# Patient Record
Sex: MALE | Race: ASIAN | NOT HISPANIC OR LATINO | Employment: FULL TIME | URBAN - METROPOLITAN AREA
[De-identification: names, ages, dates, MRNs, and addresses within clinical notes are randomized per-mention and may not be internally consistent; named-entity substitution may affect disease eponyms.]

---

## 2021-08-05 ENCOUNTER — HOSPITAL ENCOUNTER (EMERGENCY)
Facility: CLINIC | Age: 36
Discharge: HOME | End: 2021-08-05
Attending: EMERGENCY MEDICINE | Admitting: EMERGENCY MEDICINE
Payer: COMMERCIAL

## 2021-08-05 VITALS
TEMPERATURE: 98.2 F | OXYGEN SATURATION: 99 % | RESPIRATION RATE: 21 BRPM | SYSTOLIC BLOOD PRESSURE: 138 MMHG | DIASTOLIC BLOOD PRESSURE: 88 MMHG | HEART RATE: 91 BPM | WEIGHT: 155 LBS

## 2021-08-05 DIAGNOSIS — T63.441A ANAPHYLACTIC REACTION TO BEE STING, ACCIDENTAL OR UNINTENTIONAL, INITIAL ENCOUNTER: ICD-10-CM

## 2021-08-05 DIAGNOSIS — T78.2XXA ANAPHYLACTIC REACTION TO BEE STING, ACCIDENTAL OR UNINTENTIONAL, INITIAL ENCOUNTER: ICD-10-CM

## 2021-08-05 PROCEDURE — 96374 THER/PROPH/DIAG INJ IV PUSH: CPT

## 2021-08-05 PROCEDURE — 250N000009 HC RX 250: Performed by: EMERGENCY MEDICINE

## 2021-08-05 PROCEDURE — 96375 TX/PRO/DX INJ NEW DRUG ADDON: CPT

## 2021-08-05 PROCEDURE — 250N000009 HC RX 250

## 2021-08-05 PROCEDURE — 96372 THER/PROPH/DIAG INJ SC/IM: CPT | Performed by: EMERGENCY MEDICINE

## 2021-08-05 PROCEDURE — 250N000011 HC RX IP 250 OP 636: Performed by: EMERGENCY MEDICINE

## 2021-08-05 PROCEDURE — 99284 EMERGENCY DEPT VISIT MOD MDM: CPT | Mod: 25

## 2021-08-05 RX ORDER — LIDOCAINE 40 MG/G
CREAM TOPICAL
Status: COMPLETED
Start: 2021-08-05 | End: 2021-08-05

## 2021-08-05 RX ORDER — DIPHENHYDRAMINE HYDROCHLORIDE 50 MG/ML
50 INJECTION INTRAMUSCULAR; INTRAVENOUS ONCE
Status: COMPLETED | OUTPATIENT
Start: 2021-08-05 | End: 2021-08-05

## 2021-08-05 RX ORDER — FAMOTIDINE 20 MG/1
20 TABLET, FILM COATED ORAL 2 TIMES DAILY
Qty: 10 TABLET | Refills: 0 | Status: SHIPPED | OUTPATIENT
Start: 2021-08-05 | End: 2021-08-10

## 2021-08-05 RX ORDER — EPINEPHRINE 1 MG/ML
0.3 INJECTION, SOLUTION, CONCENTRATE INTRAVENOUS ONCE
Status: COMPLETED | OUTPATIENT
Start: 2021-08-05 | End: 2021-08-05

## 2021-08-05 RX ORDER — PREDNISONE 20 MG/1
40 TABLET ORAL DAILY
Qty: 6 TABLET | Refills: 0 | Status: SHIPPED | OUTPATIENT
Start: 2021-08-05 | End: 2021-08-08

## 2021-08-05 RX ORDER — DIPHENHYDRAMINE HYDROCHLORIDE 50 MG/ML
25 INJECTION INTRAMUSCULAR; INTRAVENOUS ONCE
Status: DISCONTINUED | OUTPATIENT
Start: 2021-08-05 | End: 2021-08-05

## 2021-08-05 RX ORDER — EPINEPHRINE 0.3 MG/.3ML
0.3 INJECTION SUBCUTANEOUS
Qty: 2 EACH | Refills: 1 | Status: SHIPPED | OUTPATIENT
Start: 2021-08-05

## 2021-08-05 RX ORDER — DIPHENHYDRAMINE HCL 25 MG
25 TABLET ORAL EVERY 6 HOURS PRN
Qty: 30 TABLET | Refills: 0 | Status: SHIPPED | OUTPATIENT
Start: 2021-08-05

## 2021-08-05 RX ORDER — METHYLPREDNISOLONE SODIUM SUCCINATE 125 MG/2ML
125 INJECTION, POWDER, LYOPHILIZED, FOR SOLUTION INTRAMUSCULAR; INTRAVENOUS ONCE
Status: COMPLETED | OUTPATIENT
Start: 2021-08-05 | End: 2021-08-05

## 2021-08-05 RX ADMIN — FAMOTIDINE 20 MG: 10 INJECTION, SOLUTION INTRAVENOUS at 20:32

## 2021-08-05 RX ADMIN — DIPHENHYDRAMINE HYDROCHLORIDE 50 MG: 50 INJECTION, SOLUTION INTRAMUSCULAR; INTRAVENOUS at 20:32

## 2021-08-05 RX ADMIN — METHYLPREDNISOLONE SODIUM SUCCINATE 125 MG: 125 INJECTION, POWDER, FOR SOLUTION INTRAMUSCULAR; INTRAVENOUS at 20:31

## 2021-08-05 RX ADMIN — LIDOCAINE: 40 CREAM TOPICAL at 21:19

## 2021-08-05 RX ADMIN — EPINEPHRINE 0.3 MG: 1 INJECTION INTRAMUSCULAR; INTRAVENOUS; SUBCUTANEOUS at 20:25

## 2021-08-06 ASSESSMENT — ENCOUNTER SYMPTOMS
FEVER: 0
VOMITING: 0
DIARRHEA: 0
COUGH: 0

## 2021-08-06 NOTE — ED PROVIDER NOTES
History     Chief Complaint:  Allergic Reaction       HPI     Trisha Keys is a 36 year old male who presents with allergic reaction.  Patient reports 30 minutes prior to arrival he was stung by a bee on his right lower leg.  Shortly afterwards he developed diffuse urticaria with pruritus and a sense that his throat was closing.  There is no associated shortness of breath, vomiting or chest pain.  He used an old EpiPen that was greater than 5 years old without any response.  He had not taken any other medications prior to arrival.  He has no other complaints or concerns.    Allergies:  NKDA    Medications:    None    Past Medical History:    None    Family History:    Denies significant family history    Social History:      PCP: No Ref-Primary, Physician     Review of Systems   Constitutional: Negative for fever.   Respiratory: Negative for cough.    Cardiovascular: Negative for chest pain.   Gastrointestinal: Negative for diarrhea and vomiting.   Skin: Positive for rash.   All other systems reviewed and are negative.        Physical Exam   Patient Vitals for the past 24 hrs:   BP Temp Temp src Pulse Resp SpO2 Weight   08/05/21 2245 138/88 -- -- 91 21 99 % --   08/05/21 2230 123/77 -- -- 81 26 100 % --   08/05/21 2215 125/67 -- -- 79 22 98 % --   08/05/21 2200 119/78 -- -- 88 19 99 % --   08/05/21 2145 116/78 -- -- 86 21 100 % --   08/05/21 2130 120/58 -- -- 82 19 99 % --   08/05/21 2115 119/77 -- -- 85 24 98 % --   08/05/21 2100 123/76 -- -- 84 21 99 % --   08/05/21 2045 (!) 134/90 -- -- 86 21 100 % --   08/05/21 2030 (!) 138/96 98.2  F (36.8  C) Temporal 96 21 -- --   08/05/21 2019 126/65 -- -- 120 18 97 % --   08/05/21 2018 -- -- -- -- -- -- 70.3 kg (155 lb)        Physical Exam    HEENT:    Oropharynx is moist, without lesions or trismus.     No posterior pharyngeal edema.     No pooling of secretions.  Eyes:    Conjunctiva normal  Neck:    Supple, no meningismus.     CV:     Regular rate and rhythm.       No murmurs, rubs or gallops.       No  lower extremity edema.  PULM:    Clear to auscultation bilateral.       No respiratory distress.      Good air exchange.     No wheezing or stridor.  ABD:    Soft, non-tender, non-distended.      No rebound, guarding or rigidity.  MSK:     No gross deformity to all four extremities.   LYMPH:   No cervical lymphadenopathy.  NEURO:   Alert, good muscular tone, no atrophy.   Skin:    Warm, dry and intact.      Diffuse urticarial rash to the chest, abdomen, face and proximal extremities  Psych:    Mood is good and affect is appropriate.    Emergency Department Course       Interventions:    Medications   famotidine (PEPCID) injection 20 mg (20 mg Intravenous Given 21)   EPINEPHrine PF (ADRENALIN) injection 0.3 mg (0.3 mg Intramuscular Given 21)   methylPREDNISolone sodium succinate (solu-MEDROL) injection 125 mg (125 mg Intravenous Given 21)   diphenhydrAMINE (BENADRYL) injection 50 mg (50 mg Intravenous Given 21)   lidocaine (LMX4) 4 % cream (  Given 21)        Emergency Department Course:  Past medical records, nursing notes, and vitals reviewed.  I performed an exam of the patient and obtained history, as documented above.      2230: I rechecked the patient.  Patient has a faint residual urticarial rash to the trunk but no additional locations.  Patient has complete resolution of his throat closing sensation.  He feels nearly back to baseline.  He is comfortable with discharge home and understands that he is going to fill his EpiPen prescription before leaving the hospital.      Patient was discharged home.    Impression & Plan      Medical Decision Makin-year-old male presented to the ED with anaphylaxis after a bee sting.  He had pronounced urticaria with a sense that his throat was closing but otherwise appeared well.  He was hemodynamically stable throughout ED course.  With IM epinephrine, antihistamines and  steroids, he has had near complete resolution of all symptoms with exception of a faint urticaria to the trunk alone.  Patient safe for discharge home after 2 hour observation period with steroids, antihistamines and EpiPen as needed.  Patient to return to ED for any worsening symptoms.    Diagnosis:    ICD-10-CM    1. Anaphylactic reaction to bee sting, accidental or unintentional, initial encounter  T63.441A         Discharge Medications:  Discharge Medication List as of 8/5/2021 10:35 PM      START taking these medications    Details   diphenhydrAMINE (BENADRYL) 25 MG tablet Take 1 tablet (25 mg) by mouth every 6 hours as needed for itching or allergies, Disp-30 tablet, R-0, Local Print      EPINEPHrine (ANY BX GENERIC EQUIV) 0.3 MG/0.3ML injection 2-pack Inject 0.3 mLs (0.3 mg) into the muscle once as needed for anaphylaxis, Disp-2 each, R-1, Local Print      famotidine (PEPCID) 20 MG tablet Take 1 tablet (20 mg) by mouth 2 times daily for 5 days, Disp-10 tablet, R-0, Local Print      predniSONE (DELTASONE) 20 MG tablet Take 2 tablets (40 mg) by mouth daily for 3 days, Disp-6 tablet, R-0, Local Print              8/6/2021   Glen Pantoja MD, MD  08/06/21 0105

## 2021-11-21 ENCOUNTER — ANCILLARY PROCEDURE (OUTPATIENT)
Dept: GENERAL RADIOLOGY | Facility: CLINIC | Age: 36
End: 2021-11-21
Attending: FAMILY MEDICINE
Payer: COMMERCIAL

## 2021-11-21 ENCOUNTER — OFFICE VISIT (OUTPATIENT)
Dept: URGENT CARE | Facility: URGENT CARE | Age: 36
End: 2021-11-21
Payer: COMMERCIAL

## 2021-11-21 VITALS
HEART RATE: 66 BPM | TEMPERATURE: 98.7 F | SYSTOLIC BLOOD PRESSURE: 128 MMHG | DIASTOLIC BLOOD PRESSURE: 76 MMHG | WEIGHT: 155 LBS | OXYGEN SATURATION: 98 %

## 2021-11-21 DIAGNOSIS — R05.3 PERSISTENT COUGH FOR 3 WEEKS OR LONGER: ICD-10-CM

## 2021-11-21 DIAGNOSIS — R53.83 OTHER FATIGUE: ICD-10-CM

## 2021-11-21 DIAGNOSIS — R05.9 COUGH IN ADULT: Primary | ICD-10-CM

## 2021-11-21 PROCEDURE — 87798 DETECT AGENT NOS DNA AMP: CPT | Performed by: FAMILY MEDICINE

## 2021-11-21 PROCEDURE — 99203 OFFICE O/P NEW LOW 30 MIN: CPT | Performed by: FAMILY MEDICINE

## 2021-11-21 PROCEDURE — 71046 X-RAY EXAM CHEST 2 VIEWS: CPT | Performed by: RADIOLOGY

## 2021-11-21 RX ORDER — AZITHROMYCIN 250 MG/1
TABLET, FILM COATED ORAL
Qty: 6 TABLET | Refills: 0 | Status: SHIPPED | OUTPATIENT
Start: 2021-11-21 | End: 2021-11-26

## 2021-11-21 RX ORDER — CODEINE PHOSPHATE AND GUAIFENESIN 10; 100 MG/5ML; MG/5ML
1-2 SOLUTION ORAL EVERY 6 HOURS PRN
Qty: 118 ML | Refills: 0 | Status: SHIPPED | OUTPATIENT
Start: 2021-11-21

## 2021-11-21 NOTE — PROGRESS NOTES
Chief Complaint   Patient presents with     Sick     lingering cough x 1 month- MAY be bronchitis,  fatigue from the cough -- taking Dayquil and Nyquil - MAY want xray     Initial differential diagnosis included but was not restricted to   Differential Diagnosis:  URI Adult/Peds: whooping cough , Asthma, Asthma exacerbation, Bronchitis-viral, Pneumonia, Sinusitis, Viral syndrome and Viral upper respiratory illness  Medical Decision Making:    ASSESMENT AND PLAN     Trisha was seen today for sick.    Diagnoses and all orders for this visit:    Cough in adult  -     XR Chest 2 Views    Other fatigue    Persistent cough for 3 weeks or longer  -     B. pertussis/parapertussis PCR-NP  -     azithromycin (ZITHROMAX) 250 MG tablet; Take 2 tablets (500 mg) by mouth daily for 1 day, THEN 1 tablet (250 mg) daily for 4 days.  -     guaiFENesin-codeine (ROBITUSSIN AC) 100-10 MG/5ML solution; Take 5-10 mLs by mouth every 6 hours as needed for cough    Discussed if symptoms get better over the next 2 weeks should follow-up. Discussed even if the result is positive for whooping cough the azithromycin should help with the symptoms   . Patient has cough symptoms going on for 3 weeks or longer would consider checking for whooping cough will treat with azithromycin for possible cough.  X-ray did show bronchial thickening which is possibly due to bronchitis.  Tylenol, Fluids, Rest and OTC cough suppressant/expectorant  Routine discharge counseling was given to the patient and the patient understands that worsening, changing or persistent symptoms should prompt an immediate call or follow up with their primary physician or the emergency department. The importance of appropriate follow up was also discussed with the patient.     I have reviewed the nursing notes.  Review of the result(s) of each unique test -   X-Ray was done, my findings are: Bronchial thickening noted no pneumonia noted.    More than 30 mins Time  spent on the date of  the encounter doing chart review, review of test results, interpretation of tests, patient visit and documentation   see orders in Epic  Pt verbalized and agreed with the plan and is aware of the worsening symptoms for which would need to follow up .  Pt was stable during time of discharge from the clinic     SUBJECTIVE     Trisha Keys is a 36 year old male presenting with a chief complaint of    Chief Complaint   Patient presents with     Sick     lingering cough x 1 month- MAY be bronchitis,  fatigue from the cough -- taking Dayquil and Nyquil - MAY want xray           Trisha Keys is a 36 year old male presenting with a chief complaint of cough - non-productive. He is an established patient of [facility].  Onset of symptoms was 1 month(s) ago.  Course of illness is worsening.    Severity moderate  Current and Associated symptoms: chest congestion  Treatment measures tried include OTC Cough med.  Predisposing factors include recent illness .    History reviewed. No pertinent past medical history.  Current Outpatient Medications   Medication Sig Dispense Refill     azithromycin (ZITHROMAX) 250 MG tablet Take 2 tablets (500 mg) by mouth daily for 1 day, THEN 1 tablet (250 mg) daily for 4 days. 6 tablet 0     guaiFENesin-codeine (ROBITUSSIN AC) 100-10 MG/5ML solution Take 5-10 mLs by mouth every 6 hours as needed for cough 118 mL 0     diphenhydrAMINE (BENADRYL) 25 MG tablet Take 1 tablet (25 mg) by mouth every 6 hours as needed for itching or allergies (Patient not taking: Reported on 11/21/2021) 30 tablet 0     EPINEPHrine (ANY BX GENERIC EQUIV) 0.3 MG/0.3ML injection 2-pack Inject 0.3 mLs (0.3 mg) into the muscle once as needed for anaphylaxis (Patient not taking: Reported on 11/21/2021) 2 each 1     Social History     Tobacco Use     Smoking status: Never Smoker     Smokeless tobacco: Never Used   Substance Use Topics     Alcohol use: Yes     History reviewed. No pertinent family history.      ROS:    10 point ROS of  systems including Constitutional, Eyes Cardiovascular, Gastroenterology, Genitourinary, Integumentary, Muscularskeletal, Psychiatric ,neurological were all negative except for pertinent positives noted in my HPI         OBJECTIVE:    /76 (BP Location: Right arm, Patient Position: Chair, Cuff Size: Adult Regular)   Pulse 66   Temp 98.7  F (37.1  C) (Oral)   Wt 70.3 kg (155 lb)   SpO2 98%   GENERAL APPEARANCE: healthy, alert and no distress  EYES: EOMI,  PERRL, conjunctiva clear  HENT: ear canals and TM's normal.  Nose and mouth without ulcers, erythema or lesions  NECK: supple, nontender, no lymphadenopathy  RESP: lungs clear to auscultation - no rales, rhonchi or wheezes  CV: regular rates and rhythm, normal S1 S2, no murmur noted  PSYCH: mentation appears normal  Physical Exam      (Note was completed, in part, with [employer] voice-recognition software. Documentation reviewed, but some grammatical, spelling, and word errors may remain.)  Maria Dolores Jasso MD on 11/21/2021 at 4:32 PM

## 2021-11-21 NOTE — LETTER
November 29, 2021      Trisha Keys  [id number] [facility] [id number]        Dear ,    We are writing to inform you of your test results. pertussis test (whooping cough test) is negative      Resulted Orders   B. pertussis/parapertussis PCR-NP   Result Value Ref Range    Bordetella pertussis DNA Not Detected Not Detected      Comment:      A negative result does not rule out the presence of PCR inhibitors or Bordetella pertussis DNA in concentrations below the limit of detection of the assay.    Bordetella parapertussis DNA Not Detected Not Detected      Comment:      A negative result does not rule out the presence of PCR inhibitors or Bordetella parapertussis DNA in concentrations below the limit of detection for the assay.    Narrative    The [employer] Molecular Simplexa (TM) Bordetella Direct assay is a FDA-approved, real-time PCR test for the qualitative detection and differentiation of Bordetella pertussis and Bordetella parapertussis in nasopharyngeal swabs. Testing is performed by the [facility]. This test is used for clinical purposes. It should not be regarded as investigational or for research. This laboratory is certified under the Clinical Laboratory Improvement Amendments of 1988 (CLIA-88) as qualified to perform high complexity clinical laboratory testing.       If you have any questions or concerns, please call the clinic at the number listed above.       Sincerely,      Maria Dolores Jasso MD

## 2021-11-23 LAB
B PARAPERT DNA SPEC QL NAA+PROBE: NOT DETECTED
B PERT DNA SPEC QL NAA+PROBE: NOT DETECTED